# Patient Record
Sex: FEMALE | Race: WHITE | NOT HISPANIC OR LATINO | URBAN - METROPOLITAN AREA
[De-identification: names, ages, dates, MRNs, and addresses within clinical notes are randomized per-mention and may not be internally consistent; named-entity substitution may affect disease eponyms.]

---
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
## 2021-09-03 ENCOUNTER — TRANSCRIPTION ENCOUNTER (OUTPATIENT)
Age: 21
End: 2021-09-03

## 2022-02-25 PROBLEM — Z78.9 OTHER SPECIFIED HEALTH STATUS: Chronic | Status: ACTIVE | Noted: 2020-08-31

## 2022-02-28 NOTE — ED ADULT NURSE NOTE - NS ED NURSE RECORD ANOTHER VITAL SIGN
Yes Ketoconazole Counseling:   Patient counseled regarding improving absorption with orange juice.  Adverse effects include but are not limited to breast enlargement, headache, diarrhea, nausea, upset stomach, liver function test abnormalities, taste disturbance, and stomach pain.  There is a rare possibility of liver failure that can occur when taking ketoconazole. The patient understands that monitoring of LFTs may be required, especially at baseline. The patient verbalized understanding of the proper use and possible adverse effects of ketoconazole.  All of the patient's questions and concerns were addressed.

## 2022-05-10 ENCOUNTER — LABORATORY RESULT (OUTPATIENT)
Age: 22
End: 2022-05-10

## 2022-05-10 ENCOUNTER — APPOINTMENT (OUTPATIENT)
Dept: GASTROENTEROLOGY | Facility: CLINIC | Age: 22
End: 2022-05-10
Payer: COMMERCIAL

## 2022-05-10 VITALS
BODY MASS INDEX: 20.89 KG/M2 | SYSTOLIC BLOOD PRESSURE: 108 MMHG | HEIGHT: 66 IN | DIASTOLIC BLOOD PRESSURE: 62 MMHG | WEIGHT: 130 LBS

## 2022-05-10 VITALS — HEART RATE: 78 BPM | RESPIRATION RATE: 14 BRPM

## 2022-05-10 PROCEDURE — 36415 COLL VENOUS BLD VENIPUNCTURE: CPT

## 2022-05-10 PROCEDURE — 99203 OFFICE O/P NEW LOW 30 MIN: CPT | Mod: 25

## 2022-05-10 NOTE — PHYSICAL EXAM
[General Appearance - Alert] : alert [General Appearance - In No Acute Distress] : in no acute distress [Sclera] : the sclera and conjunctiva were normal [PERRL With Normal Accommodation] : pupils were equal in size, round, and reactive to light [Extraocular Movements] : extraocular movements were intact [Outer Ear] : the ears and nose were normal in appearance [Oropharynx] : the oropharynx was normal [Neck Appearance] : the appearance of the neck was normal [Neck Cervical Mass (___cm)] : no neck mass was observed [Jugular Venous Distention Increased] : there was no jugular-venous distention [Thyroid Diffuse Enlargement] : the thyroid was not enlarged [Thyroid Nodule] : there were no palpable thyroid nodules [] : no respiratory distress [Auscultation Breath Sounds / Voice Sounds] : lungs were clear to auscultation bilaterally [Heart Rate And Rhythm] : heart rate was normal and rhythm regular [Heart Sounds] : normal S1 and S2 [Heart Sounds Gallop] : no gallops [Murmurs] : no murmurs [Heart Sounds Pericardial Friction Rub] : no pericardial rub [Cervical Lymph Nodes Enlarged Posterior Bilaterally] : posterior cervical [Cervical Lymph Nodes Enlarged Anterior Bilaterally] : anterior cervical [Supraclavicular Lymph Nodes Enlarged Bilaterally] : supraclavicular [Axillary Lymph Nodes Enlarged Bilaterally] : axillary [Femoral Lymph Nodes Enlarged Bilaterally] : femoral [Inguinal Lymph Nodes Enlarged Bilaterally] : inguinal [No CVA Tenderness] : no ~M costovertebral angle tenderness [No Spinal Tenderness] : no spinal tenderness [Deep Tendon Reflexes (DTR)] : deep tendon reflexes were 2+ and symmetric [Sensation] : the sensory exam was normal to light touch and pinprick [No Focal Deficits] : no focal deficits [Oriented To Time, Place, And Person] : oriented to person, place, and time [Impaired Insight] : insight and judgment were intact [Affect] : the affect was normal

## 2022-05-10 NOTE — HISTORY OF PRESENT ILLNESS
[Heartburn] : denies heartburn [Nausea] : denies nausea [Vomiting] : denies vomiting [Diarrhea] : denies diarrhea [Constipation] : denies constipation [Yellow Skin Or Eyes (Jaundice)] : denies jaundice [Abdominal Swelling] : denies abdominal swelling [Rectal Pain] : denies rectal pain [Abdominal Pain] : abdominal pain [Wt Gain ___ Lbs] : no recent weight gain [Wt Loss ___ Lbs] : no recent weight loss [GERD] : no gastroesophageal reflux disease [Hiatus Hernia] : no hiatus hernia [Peptic Ulcer Disease] : no peptic ulcer disease [Pancreatitis] : no pancreatitis [Kidney Stone] : no kidney stone [Inflammatory Bowel Disease] : no inflammatory bowel disease [Irritable Bowel Syndrome] : no irritable bowel syndrome [Diverticulitis] : no diverticulitis [Alcohol Abuse] : no alcohol abuse [Malignancy] : no malignancy [Abdominal Surgery] : no abdominal surgery [Appendectomy] : no appendectomy [Cholecystectomy] : no cholecystectomy [de-identified] : 22 yo female with long standing epigastric pain, and sensitivity to dairy.  The sensitivity to dairy seems to develop the last 2 years.  She is currently a college student.  There is no weight loss anorexia.  There is no family history of inflammatory bowel disease.  Alcohol and coffee appear to bother her stomach.  There have been no recent laboratory testing.  She describes her bowel movements is mostly soft but without any nocturnal symptoms.

## 2022-05-10 NOTE — ASSESSMENT
[FreeTextEntry1] : 22 yo female with long standing epigastric pain, and sensitivity to dairy.  The sensitivity to dairy seems to develop the last 2 years.  She is currently a college student.  There is no weight loss anorexia.  There is no family history of inflammatory bowel disease.  Alcohol and coffee appear to bother her stomach.  There have been no recent laboratory testing.  She describes her bowel movements is mostly soft but without any nocturnal symptoms.\par \par Plan:\par 1. Celiac, crp, cbc, cmp\par 2. Await calprotectin, stool hpylori\par 3.Sonogram of abdomen\par 4. RTO 3 weeks; if abnormal labs, elevated calprotectin, possible colonoscopy\par 5. Strict dairy avoidance for 1 week.

## 2022-05-11 ENCOUNTER — TRANSCRIPTION ENCOUNTER (OUTPATIENT)
Age: 22
End: 2022-05-11

## 2022-05-11 LAB
ALBUMIN SERPL ELPH-MCNC: 4.7 G/DL
ALP BLD-CCNC: 47 U/L
ALT SERPL-CCNC: 10 U/L
ANION GAP SERPL CALC-SCNC: 10 MMOL/L
AST SERPL-CCNC: 15 U/L
BASOPHILS # BLD AUTO: 0.02 K/UL
BASOPHILS NFR BLD AUTO: 0.5 %
BILIRUB SERPL-MCNC: 0.3 MG/DL
BUN SERPL-MCNC: 11 MG/DL
CALCIUM SERPL-MCNC: 9.5 MG/DL
CHLORIDE SERPL-SCNC: 106 MMOL/L
CO2 SERPL-SCNC: 22 MMOL/L
CREAT SERPL-MCNC: 0.64 MG/DL
CRP SERPL-MCNC: 3 MG/L
EGFR: 130 ML/MIN/1.73M2
EOSINOPHIL # BLD AUTO: 0.08 K/UL
EOSINOPHIL NFR BLD AUTO: 1.9 %
FERRITIN SERPL-MCNC: 31 NG/ML
GLUCOSE SERPL-MCNC: 85 MG/DL
HCT VFR BLD CALC: 41.3 %
HGB BLD-MCNC: 12.4 G/DL
IMM GRANULOCYTES NFR BLD AUTO: 0 %
LYMPHOCYTES # BLD AUTO: 2.62 K/UL
LYMPHOCYTES NFR BLD AUTO: 61.1 %
MAN DIFF?: NORMAL
MCHC RBC-ENTMCNC: 27.1 PG
MCHC RBC-ENTMCNC: 30 GM/DL
MCV RBC AUTO: 90.4 FL
MONOCYTES # BLD AUTO: 0.2 K/UL
MONOCYTES NFR BLD AUTO: 4.7 %
NEUTROPHILS # BLD AUTO: 1.37 K/UL
NEUTROPHILS NFR BLD AUTO: 31.8 %
PLATELET # BLD AUTO: 211 K/UL
POTASSIUM SERPL-SCNC: 4.6 MMOL/L
PROT SERPL-MCNC: 6.9 G/DL
RBC # BLD: 4.57 M/UL
RBC # FLD: 13.6 %
SODIUM SERPL-SCNC: 139 MMOL/L
TRANSFERRIN SERPL-MCNC: 261 MG/DL
VIT B12 SERPL-MCNC: 159 PG/ML
WBC # FLD AUTO: 4.29 K/UL

## 2022-05-12 ENCOUNTER — TRANSCRIPTION ENCOUNTER (OUTPATIENT)
Age: 22
End: 2022-05-12

## 2022-05-12 LAB — CELIACPAN: NORMAL

## 2022-05-16 ENCOUNTER — TRANSCRIPTION ENCOUNTER (OUTPATIENT)
Age: 22
End: 2022-05-16

## 2022-05-19 ENCOUNTER — OUTPATIENT (OUTPATIENT)
Dept: OUTPATIENT SERVICES | Facility: HOSPITAL | Age: 22
LOS: 1 days | End: 2022-05-19
Payer: COMMERCIAL

## 2022-05-19 ENCOUNTER — TRANSCRIPTION ENCOUNTER (OUTPATIENT)
Age: 22
End: 2022-05-19

## 2022-05-19 ENCOUNTER — APPOINTMENT (OUTPATIENT)
Dept: ULTRASOUND IMAGING | Facility: CLINIC | Age: 22
End: 2022-05-19
Payer: COMMERCIAL

## 2022-05-19 DIAGNOSIS — R14.0 ABDOMINAL DISTENSION (GASEOUS): ICD-10-CM

## 2022-05-19 DIAGNOSIS — Z00.8 ENCOUNTER FOR OTHER GENERAL EXAMINATION: ICD-10-CM

## 2022-05-19 PROCEDURE — 76700 US EXAM ABDOM COMPLETE: CPT

## 2022-05-19 PROCEDURE — 76700 US EXAM ABDOM COMPLETE: CPT | Mod: 26

## 2022-05-24 ENCOUNTER — TRANSCRIPTION ENCOUNTER (OUTPATIENT)
Age: 22
End: 2022-05-24

## 2022-05-24 LAB
ANNOTATION COMMENT IMP: NORMAL
HLA-DQ2: NEGATIVE
HLA-DQ8 QL: POSITIVE
REF LAB TEST METHOD: NORMAL

## 2022-06-02 ENCOUNTER — APPOINTMENT (OUTPATIENT)
Dept: GASTROENTEROLOGY | Facility: CLINIC | Age: 22
End: 2022-06-02

## 2022-06-09 ENCOUNTER — APPOINTMENT (OUTPATIENT)
Dept: GASTROENTEROLOGY | Facility: CLINIC | Age: 22
End: 2022-06-09
Payer: COMMERCIAL

## 2022-06-09 VITALS
HEIGHT: 66 IN | RESPIRATION RATE: 14 BRPM | SYSTOLIC BLOOD PRESSURE: 108 MMHG | DIASTOLIC BLOOD PRESSURE: 68 MMHG | WEIGHT: 130 LBS | BODY MASS INDEX: 20.89 KG/M2 | TEMPERATURE: 98.1 F

## 2022-06-09 PROCEDURE — 36415 COLL VENOUS BLD VENIPUNCTURE: CPT

## 2022-06-09 PROCEDURE — 99214 OFFICE O/P EST MOD 30 MIN: CPT | Mod: 25

## 2022-06-09 NOTE — HISTORY OF PRESENT ILLNESS
[Heartburn] : denies heartburn [Nausea] : denies nausea [Vomiting] : denies vomiting [Diarrhea] : denies diarrhea [Constipation] : denies constipation [Yellow Skin Or Eyes (Jaundice)] : denies jaundice [Abdominal Swelling] : denies abdominal swelling [Rectal Pain] : denies rectal pain [Abdominal Pain] : abdominal pain [Wt Gain ___ Lbs] : no recent weight gain [Wt Loss ___ Lbs] : no recent weight loss [GERD] : no gastroesophageal reflux disease [Hiatus Hernia] : no hiatus hernia [Peptic Ulcer Disease] : no peptic ulcer disease [Pancreatitis] : no pancreatitis [Kidney Stone] : no kidney stone [Inflammatory Bowel Disease] : no inflammatory bowel disease [Irritable Bowel Syndrome] : no irritable bowel syndrome [Diverticulitis] : no diverticulitis [Alcohol Abuse] : no alcohol abuse [Malignancy] : no malignancy [Abdominal Surgery] : no abdominal surgery [Appendectomy] : no appendectomy [Cholecystectomy] : no cholecystectomy [de-identified] : 22 yo female with long standing epigastric pain, and sensitivity to dairy.  The sensitivity to dairy seems to develop the last 2 years.  She is currently a college student.  There is no weight loss anorexia.  There is no family history of inflammatory bowel disease.  Alcohol and coffee appear to bother her stomach.  There have been no recent laboratory testing.  She describes her bowel movements is mostly soft but without any nocturnal symptoms.\par \par Returns in followup: sonogram normal fecal calprotecin, PCR assay , hpylori all normal. Celiac panel and cbc, reactive protein normal . B12 was deficient at 159. She denies paresthesias, numbness. Doesn't  eat much meat.

## 2022-06-09 NOTE — ASSESSMENT
[FreeTextEntry1] : 20 yo female with long standing epigastric pain, and sensitivity to dairy.  The sensitivity to dairy seems to develop the last 2 years.  She is currently a college student.  There is no weight loss anorexia.  There is no family history of inflammatory bowel disease.  Alcohol and coffee appear to bother her stomach.  There have been no recent laboratory testing.  She describes her bowel movements is mostly soft but without any nocturnal symptoms.\par \par Returns in followup: sonogram normal fecal calprotecin, PCR assay , hpylori all normal. Celiac panel and cbc, reactive protein normal . B12 was deficient at 159. She denies paresthesias, numbness. Doesn't  eat much meat.\par \par \par Plan:\par 1. B12 1000 mg daily.\par 2. followup in  2 months\par 3. PRN lactaid.

## 2022-06-10 ENCOUNTER — TRANSCRIPTION ENCOUNTER (OUTPATIENT)
Age: 22
End: 2022-06-10

## 2022-06-10 LAB — VIT B12 SERPL-MCNC: 249 PG/ML

## 2022-08-18 ENCOUNTER — APPOINTMENT (OUTPATIENT)
Dept: GASTROENTEROLOGY | Facility: CLINIC | Age: 22
End: 2022-08-18

## 2022-08-18 VITALS
SYSTOLIC BLOOD PRESSURE: 110 MMHG | DIASTOLIC BLOOD PRESSURE: 70 MMHG | WEIGHT: 129 LBS | BODY MASS INDEX: 21.49 KG/M2 | HEIGHT: 65 IN

## 2022-08-18 VITALS — HEART RATE: 64 BPM | RESPIRATION RATE: 12 BRPM

## 2022-08-18 DIAGNOSIS — R19.7 DIARRHEA, UNSPECIFIED: ICD-10-CM

## 2022-08-18 PROCEDURE — 36415 COLL VENOUS BLD VENIPUNCTURE: CPT

## 2022-08-18 PROCEDURE — 99214 OFFICE O/P EST MOD 30 MIN: CPT | Mod: 25

## 2022-08-18 NOTE — HISTORY OF PRESENT ILLNESS
[Heartburn] : denies heartburn [Nausea] : denies nausea [Vomiting] : denies vomiting [Diarrhea] : denies diarrhea [Constipation] : denies constipation [Yellow Skin Or Eyes (Jaundice)] : denies jaundice [Abdominal Swelling] : denies abdominal swelling [Rectal Pain] : denies rectal pain [Abdominal Pain] : abdominal pain [_________] : Performed [unfilled] [Wt Gain ___ Lbs] : no recent weight gain [Wt Loss ___ Lbs] : no recent weight loss [GERD] : no gastroesophageal reflux disease [Hiatus Hernia] : no hiatus hernia [Peptic Ulcer Disease] : no peptic ulcer disease [Pancreatitis] : no pancreatitis [Kidney Stone] : no kidney stone [Inflammatory Bowel Disease] : no inflammatory bowel disease [Irritable Bowel Syndrome] : no irritable bowel syndrome [Diverticulitis] : no diverticulitis [Alcohol Abuse] : no alcohol abuse [Malignancy] : no malignancy [Abdominal Surgery] : no abdominal surgery [Appendectomy] : no appendectomy [Cholecystectomy] : no cholecystectomy [de-identified] : 22 yo female with long standing epigastric pain, and sensitivity to dairy.  The sensitivity to dairy seems to develop the last 2 years.  She is currently a college student.  There is no weight loss anorexia.  There is no family history of inflammatory bowel disease.  Alcohol and coffee appear to bother her stomach.  There have been no recent laboratory testing.  She describes her bowel movements is mostly soft but without any nocturnal symptoms.\par \par She returns in follow-up today having taking her B12.  We will reassess today.  She still complains of some bloating which is not associated with meals or bowel movements.  We discussed the usage of hyoscyamine.  While she feels well.  Uses over-the-counter Lactaid supplements with good benefit with dairy products. [de-identified] : normal

## 2022-08-20 ENCOUNTER — TRANSCRIPTION ENCOUNTER (OUTPATIENT)
Age: 22
End: 2022-08-20

## 2022-08-21 ENCOUNTER — TRANSCRIPTION ENCOUNTER (OUTPATIENT)
Age: 22
End: 2022-08-21

## 2022-08-21 LAB — VIT B12 SERPL-MCNC: 523 PG/ML

## 2022-08-23 LAB — METHYLMALONATE SERPL-SCNC: 105 NMOL/L

## 2022-09-13 ENCOUNTER — TRANSCRIPTION ENCOUNTER (OUTPATIENT)
Age: 22
End: 2022-09-13

## 2022-10-19 ENCOUNTER — RESULT REVIEW (OUTPATIENT)
Age: 22
End: 2022-10-19

## 2022-12-14 ENCOUNTER — APPOINTMENT (OUTPATIENT)
Dept: GASTROENTEROLOGY | Facility: CLINIC | Age: 22
End: 2022-12-14

## 2022-12-14 VITALS
HEART RATE: 74 BPM | WEIGHT: 129 LBS | HEIGHT: 65 IN | BODY MASS INDEX: 21.49 KG/M2 | OXYGEN SATURATION: 99 % | RESPIRATION RATE: 12 BRPM | SYSTOLIC BLOOD PRESSURE: 98 MMHG | DIASTOLIC BLOOD PRESSURE: 60 MMHG

## 2022-12-14 DIAGNOSIS — E53.8 DEFICIENCY OF OTHER SPECIFIED B GROUP VITAMINS: ICD-10-CM

## 2022-12-14 DIAGNOSIS — E04.1 NONTOXIC SINGLE THYROID NODULE: ICD-10-CM

## 2022-12-14 DIAGNOSIS — N92.6 IRREGULAR MENSTRUATION, UNSPECIFIED: ICD-10-CM

## 2022-12-14 PROCEDURE — 99214 OFFICE O/P EST MOD 30 MIN: CPT

## 2022-12-14 NOTE — HISTORY OF PRESENT ILLNESS
[Heartburn] : denies heartburn [Nausea] : denies nausea [Vomiting] : denies vomiting [Diarrhea] : denies diarrhea [Constipation] : denies constipation [Yellow Skin Or Eyes (Jaundice)] : denies jaundice [Abdominal Swelling] : denies abdominal swelling [Rectal Pain] : denies rectal pain [Abdominal Pain] : abdominal pain [_________] : Performed [unfilled] [FreeTextEntry1] : 21 yo female with long standing epigastric pain, and sensitivity to dairy.  The sensitivity to dairy seems to develop the last 2 years.  She is currently a college student.  There is no weight loss anorexia.  There is no family history of inflammatory bowel disease.  Alcohol and coffee appear to bother her stomach. \par \par We assessed her B12 supplementation is now to normal level.  Other laboratory tests were fine.  She is doing much better since her last visit using Lactaid tablets before dairy meals and she has identified certain triggers such as rice for abdominal discomfort.  She is using hyoscyamine on a as needed basis.  There is no weight loss or anorexia [de-identified] : 05/2022 normal [Wt Gain ___ Lbs] : no recent weight gain [Wt Loss ___ Lbs] : no recent weight loss [GERD] : no gastroesophageal reflux disease [Hiatus Hernia] : no hiatus hernia [Peptic Ulcer Disease] : no peptic ulcer disease [Pancreatitis] : no pancreatitis [Kidney Stone] : no kidney stone [Inflammatory Bowel Disease] : no inflammatory bowel disease [Irritable Bowel Syndrome] : no irritable bowel syndrome [Diverticulitis] : no diverticulitis [Alcohol Abuse] : no alcohol abuse [Malignancy] : no malignancy [Abdominal Surgery] : no abdominal surgery [Appendectomy] : no appendectomy [Cholecystectomy] : no cholecystectomy [de-identified] : normal

## 2022-12-14 NOTE — PHYSICAL EXAM
[General Appearance - Alert] : alert [General Appearance - In No Acute Distress] : in no acute distress [PERRL With Normal Accommodation] : pupils were equal in size, round, and reactive to light [Extraocular Movements] : extraocular movements were intact [Outer Ear] : the ears and nose were normal in appearance [Neck Appearance] : the appearance of the neck was normal [Neck Cervical Mass (___cm)] : no neck mass was observed [Jugular Venous Distention Increased] : there was no jugular-venous distention [Thyroid Diffuse Enlargement] : the thyroid was not enlarged [Thyroid Nodule] : there were no palpable thyroid nodules [Heart Sounds] : normal S1 and S2 [Heart Sounds Gallop] : no gallops [Heart Sounds Pericardial Friction Rub] : no pericardial rub [Cervical Lymph Nodes Enlarged Posterior Bilaterally] : posterior cervical [Cervical Lymph Nodes Enlarged Anterior Bilaterally] : anterior cervical [Supraclavicular Lymph Nodes Enlarged Bilaterally] : supraclavicular [Axillary Lymph Nodes Enlarged Bilaterally] : axillary [Femoral Lymph Nodes Enlarged Bilaterally] : femoral [Inguinal Lymph Nodes Enlarged Bilaterally] : inguinal [No CVA Tenderness] : no ~M costovertebral angle tenderness [No Spinal Tenderness] : no spinal tenderness [Deep Tendon Reflexes (DTR)] : deep tendon reflexes were 2+ and symmetric [Sensation] : the sensory exam was normal to light touch and pinprick [No Focal Deficits] : no focal deficits [Impaired Insight] : insight and judgment were intact [Affect] : the affect was normal [Alert] : alert [Normal Voice/Communication] : normal voice/communication [Healthy Appearing] : healthy appearing [No Acute Distress] : no acute distress [Sclera] : the sclera and conjunctiva were normal [Hearing Threshold Finger Rub Not [place]] : hearing was normal [Normal Lips/Gums] : the lips and gums were normal [Oropharynx] : the oropharynx was normal [Normal Appearance] : the appearance of the neck was normal [No Neck Mass] : no neck mass was observed [No Respiratory Distress] : no respiratory distress [No Acc Muscle Use] : no accessory muscle use [Respiration, Rhythm And Depth] : normal respiratory rhythm and effort [Auscultation Breath Sounds / Voice Sounds] : lungs were clear to auscultation bilaterally [Heart Rate And Rhythm] : heart rate was normal and rhythm regular [Normal S1, S2] : normal S1 and S2 [Murmurs] : no murmurs [Bowel Sounds] : normal bowel sounds [Abdomen Tenderness] : non-tender [No Masses] : no abdominal mass palpated [Abdomen Soft] : soft [] : no hepatosplenomegaly [Oriented To Time, Place, And Person] : oriented to person, place, and time

## 2022-12-27 ENCOUNTER — RX RENEWAL (OUTPATIENT)
Age: 22
End: 2022-12-27

## 2022-12-27 RX ORDER — HYOSCYAMINE SULFATE 0.12 MG/1
0.12 TABLET SUBLINGUAL 3 TIMES DAILY
Qty: 90 | Refills: 1 | Status: ACTIVE | COMMUNITY
Start: 2022-08-18 | End: 1900-01-01

## 2023-08-13 NOTE — ASSESSMENT
Upon arrival to place PICC patient's condition had deteriorated, Dr Lal at bedside placing IJ.   [FreeTextEntry1] : 22 yo female with long standing epigastric pain, and sensitivity to dairy.  The sensitivity to dairy seems to develop the last 2 years.  She is currently a college student.  There is no weight loss anorexia.  There is no family history of inflammatory bowel disease.  Alcohol and coffee appear to bother her stomach.  There have been no recent laboratory testing.  She describes her bowel movements is mostly soft but without any nocturnal symptoms.\par \par Returns in followup: sonogram normal fecal calprotecin, PCR assay , hpylori all normal. Celiac panel and cbc, reactive protein normal . B12 was deficient at 159. She denies paresthesias, numbness. Doesn't  eat much meat.\par \par \par She has been taking her B12 1000 mcg a day.  Her bloating we will treat with hyoscyamine 0.125 mg sublingual at least twice a day and she will put in follow-up within 1 to 2 weeks.  She will continue with the lactate.  I asked her to increase her physical activity and I will see her on a as needed basis

## 2023-10-04 NOTE — ED PROVIDER NOTE - NS ED MD DISPO DISCHARGE
FEMALE ANNUAL EXAM NOTE    HISTORY  Neha Richard  is a 28 year old female who presents for an annual exam. She did not get her pre-visit lab done; she is fasting today to do lab.    Last year had abnormal pap; had colposcopy with Dr Dhara Geiger OB/ GYN, is seeing her again in November for repeat pap/ exam.       MEDICAL HISTORY  Past Medical History:   Diagnosis Date   • Bipolar 2 disorder (CMD)     following Dr. Cyndi Ponce but not since pregnant   • Depressive disorder    • Dysfunction of both eustachian tubes 2022   • Failed moderate sedation during procedure    • Vertigo 2022       SURGICAL HISTORY  Past Surgical History:   Procedure Laterality Date   •  section, low transverse  2016    Primary    • Closed reduction forearm fracture  10/03/2007    without internal fixation   • Colonoscopy diagnostic  2023   • Colposcopy cervix & upper / adjacent vagina  10/31/2022    Dr. Geiger   • Esophagogastroduodenoscopy transoral flex w/bx single or mult  2023   • Intrauterine copper contraceptive  2019    Paragard[facility]   • Nail removal  2008    ingrown toenail -Great right toe   • Wrist surgery Right 2021    carpal boss excision       SOCIAL HISTORY  Social History     Tobacco Use   • Smoking status: Never     Passive exposure: Never   • Smokeless tobacco: Never   Vaping Use   • Vaping Use: never used   Substance Use Topics   • Alcohol use: Yes     Alcohol/week: 0.0 - 1.0 standard drinks of alcohol     Comment: occasional   • Drug use: No       Social History     Social History Narrative    Grew up in home with both parents being together and . #2 of 3 children born to her parents. Graduated from [employer] in . Then [employer] for one semester - didn't finish a program. Single never . One daughter born 3-. Lives in an apartment with her daughter. Angel had visitation with her father 2-2-3 schedule so  about 50% each parent. 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         Health Maintenance:        Mammogram:NA.  Great Aunt on father's side.  Age 40s    Colon Cancer Screening:NA     Pap Smear:10/25/17 normal 3 year    Cholesterol Screening:10/25/17 normal    Influenza Vaccine: 10/25/17 defers    Pneumonia Vaccine:Na    HPV Vaccine:DUE    DTAP Booster: 1/22/16    Dental Visit: Has one coming up 10/17    Eye Exam: Glasses.  2017    POA:       FAMILY HISTORY  Family History   Problem Relation Age of Onset   • ADHD/ADD Mother    • Bipolar disorder Mother    • Obsesive Compulsive Disorder Mother    • Migraine Mother    • Diabetes Father         Type 2, oral meds    • Bipolar disorder Brother    • Heart disease Maternal Grandmother    • Alcohol Abuse Paternal Grandfather    • Schizophrenia Other    • Cancer, Breast Other         P-great aunt    • Cancer Other         P-great aunt    • NEGATIVE FAMILY HX OF Other         FOB- Maternal side with twins    • NEGATIVE FAMILY HX OF Other         Denies fetal anamolies        MEDICATIONS  Current Outpatient Medications   Medication Sig Dispense Refill   • famotidine (Pepcid) 20 MG tablet Take 1 tablet by mouth 2 times daily as needed (Acid reflux). 60 tablet 5   • dicyclomine (BENTYL) 10 MG capsule Take 1 capsule by mouth 4 times daily as needed (abdominal pain). 60 capsule 1     No current facility-administered medications for this visit.         ALLERGIES  ALLERGIES:   Allergen Reactions   • Mirtazapine Other (See Comments)     Weight gain    • Viibryd VOMITING       REVIEW OF SYSTEMS  Overall \"I feel fine, I feel not super motivated to get things done. Working 10 pm to 7 am. Sleeps 9-3 while daughter is in school, no energy to get house work done\". Hoping to get a different job -different  work hours.   Constitutional:  Denies fevers, chills, weakness, fatigue, loss of appetite, abnormal weight gain or abnormal weight loss.  Eyes:  Denies blindness, blurred vision,  double vision, pain, itching or burning. Last eye exam - < 1 year; wearing glasses  HENT:  Denies facial pain, ear pain, hearing loss, tinnitus, nasal congestion, rhinorrhea, epistaxis, sinus pain, mouth lesions or sore throat.  Last dental exam - \"probably seven - eight years\".   Respiratory:  Denies SOB (shortness of breath), cough, sputum production, hemoptysis or wheezing.    Cardiovascular:  Denies chest pains, palpitations, tachycardia, edema, cyanosis or vertigo.  Gastrointestinal:  Denies abdominal pain, heartburn, nausea, vomiting, diarrhea, constipation or blood in stool.  Bowel Movement - depending on what eats; once or twice a day - if eats wrong food 4-5 times a day.established with Lilli JACKSON - last saw her 2023  Breast: Denies any changes. Does self breast exam- yes  Musculoskeletal:  Denies back pain, joint pain, joint swelling or tenderness, muscle pains or spasms. Denies neck pain, stiffness or swelling.  Neurologic:  Denies numbness, tingling, other sensory changes, sudden weakness in arms or legs. Denies confusion, headache, dizziness, memory loss or tremors.  Genitourinary:  Denies urinary frequency, nocturia, urgency, incontinence, dysuria or hematuria.  Menarche- age 12,  Patient's last menstrual period was 2023 (exact date). once every month to every other month. Not in currentl relationship  Hematologic/Lymphatic:  Denies easy bruising or bleeding, swollen lymph glands.  Endocrine:  Denies heat or cold intolerance, polydipsia or polyuria.  Denies changes in hair or skin texture.  Integument:  Denies new rashes or lesions, pruritus or dryness of skin.  Psychiatric:  Denies anxiety, depression, hallucinations, irritability or sleeping problems.  Allergic/Immunologic:  Denies recurrent infections, hypersensitivity.  Last DtaP- 2016  Habits:  Diet - tries to avoid spicy, fatty foods, acidic foods, - eats bread, fruit, veggies, some cottage cheese only/ avoids dairy.  Ok with penaut butter Calcium intake - salads 3-4 times per week, cottage cheese but not recently, Caffeine intake - one Mountain Dew per day, Exercise- tracking steps; does a lot of walking at night - walks 10 minutes to / from daughters school bid, Sleep limited due to working night shift - sleeps after daughter goes to school 8:30 am up around 3 -3:10 for when daughter is done with school    All other Review of Systems negative.    PHYSICAL EXAMINATION  Vital Signs:  Blood pressure 102/54, pulse (!) 56, resp. rate 16, height 5' 1\" (1.549 m), weight 53.2 kg (117 lb 4.6 oz), last menstrual period 09/04/2023. Body mass index is 22.16 kg/m².  General:  Well-developed, well-nourished. In no apparent distress.  Eyes:  PERRL, EOMI (Pupils equal, round, reactive to light, extraocular movements intact). Conjunctivae pink. Sclerae anicteric.  HENT:  Normocephalic, atraumatic. Bilateral external ears, ear canals and tympanic membranes are normal. External nose is normal. Septum is midline. Turbinates non-edematous, pink and moist. Oropharynx is clear.  Uvula midline. Oral membranes moist. Lips pink.  Neck:  Supple. Nontender. Normal range of motion. No masses. No thyromegaly.  Trachea midline.  Respiratory:  Normal respiratory effort. Respirations regular and relaxed. Lungs clear to auscultation bilaterally. Symmetrical chest expansion.  Cardiovascular:  Regular rate and rhythm. No murmurs. Normal S1 and S2..No carotid bruits. Good dorsalis pedis pulses bilaterally. No peripheral edema.  Gastrointestinal:  Soft. Nontender. Non-distended. Normal bowel sounds. No pulsatile or other abdominal masses. No hepatosplenomegaly or splenomegaly.   Breasts:  Symmetric. No masses. No nipple discharge.  Genitourinary:  Not examined today  Musculoskeletal:  No clubbing or cyanosis. Active range of motion in all 4 extremities proximal and distal. No joint swelling or tenderness in right and left shoulders, elbows, wrists and fingers. No  joint swelling or tenderness in left and right knees, ankles and toes.  Neurologic:  Alert and oriented times 3. Gait is normal. Normal sensory function. No motor deficits in all 4 extremities.  Symmetrical bilateral knee deep tendon relflexes.  Integumentary:  Warm. Dry. Pink. No rashes or lesions. No wounds.  Lymphatic:  No lymphadenopathy in submental, submandibular or cervical chain. No supraclavicular or infraclavicular lymphadenopathy. No axillary or inguinal/groin lymphadenopathy.  Psychiatric: Cooperative. Appropriate mood and affect. Normal judgment.      Review Flowsheet  More data exists       10/4/2023   PHQ 2/9 Score   Adult PHQ 2 Score 0   Adult PHQ 2 Interpretation No further screening needed   Little interest or pleasure in activity? Not at all   Feeling down, depressed or hopeless? Not at all       DEPRESSION ASSESSMENT/PLAN:  Depression screening is negative no further plan needed.    RESULTS  Pertinent labs and imaging studies reviewed.    ASSESSMENT AND PLAN  1. A 28-year-old  female, established in Family Practice complete physical exam. Continue SBE; first pap age 41. Pap with ob/ gyn provider in November. Follow per Dr. Geiger's recommendations. Consider calcium supplement due to limited calcium intake up to 1000 mg daily. Consider vitamin D supplement. Continue cardiovascular exercise 30 minute most days the week which she feels she is getting.Immunizations: recommend covid vaccine - has never had any; declined. Recommend seasonal influenza declined. Going to lab now for fasting lab. In future prefer pre-visit lab so can review results together at appointment. Be seen in clinic one year.     2. Normal BMI Body mass index is 22.16 kg/m².BMI ASSESSMENT/PLAN:  Patient BMI is within normal range.     3. Irritable bowel with diarrhea, GERD without esophagitis, chronic superficial gastritis without bleeding. Followed per GI providers recommendations.        Home

## 2024-04-06 ENCOUNTER — NON-APPOINTMENT (OUTPATIENT)
Age: 24
End: 2024-04-06

## 2024-05-03 ENCOUNTER — APPOINTMENT (OUTPATIENT)
Dept: GASTROENTEROLOGY | Facility: CLINIC | Age: 24
End: 2024-05-03
Payer: COMMERCIAL

## 2024-05-03 VITALS
TEMPERATURE: 97 F | WEIGHT: 126 LBS | BODY MASS INDEX: 20.99 KG/M2 | HEIGHT: 65 IN | DIASTOLIC BLOOD PRESSURE: 70 MMHG | OXYGEN SATURATION: 99 % | SYSTOLIC BLOOD PRESSURE: 104 MMHG | RESPIRATION RATE: 14 BRPM | HEART RATE: 98 BPM

## 2024-05-03 DIAGNOSIS — F41.9 ANXIETY DISORDER, UNSPECIFIED: ICD-10-CM

## 2024-05-03 DIAGNOSIS — R14.0 ABDOMINAL DISTENSION (GASEOUS): ICD-10-CM

## 2024-05-03 PROCEDURE — 99213 OFFICE O/P EST LOW 20 MIN: CPT

## 2024-05-03 NOTE — ASSESSMENT
[FreeTextEntry1] : 24 yo female with long standing epigastric pain, and sensitivity to dairy.  The sensitivity to dairy seems to develop the last 2 years.  She is currently a college student.  There is no weight loss anorexia.  There is no family history of inflammatory bowel disease.  Alcohol and coffee appear to bother her stomach.   We assessed her B12 supplementation is now to normal level.  Other laboratory tests were fine.  She is doing much better since her last visit using Lactaid tablets before dairy meals and she has identified certain triggers such as rice for abdominal discomfort.  She is using hyoscyamine on a as needed basis.  There is no weight loss or anorexia  RTO today, now with bloating, epigastric discomfort. No weight loss, anorexia. No abnormal bms.  IMP: 1. lactose intolerance 2. IBS  PLAN: 1. RTO prn 2. famotidine 40 mg po qhs x 30 days. pt encouraged to update me as to how she is in 2 weeks.

## 2024-05-03 NOTE — HISTORY OF PRESENT ILLNESS
[FreeTextEntry1] : 22 yo female with long standing epigastric pain, and sensitivity to dairy.  The sensitivity to dairy seems to develop the last 2 years.  She is currently a college student.  There is no weight loss anorexia.  There is no family history of inflammatory bowel disease.  Alcohol and coffee appear to bother her stomach.   We assessed her B12 supplementation is now to normal level.  Other laboratory tests were fine.  She is doing much better since her last visit using Lactaid tablets before dairy meals and she has identified certain triggers such as rice for abdominal discomfort.  She is using hyoscyamine on a as needed basis.  There is no weight loss or anorexia  RTO today, now with bloating, epigastric discomfort. No weight loss, anorexia. No abnormal bms. [de-identified] : 2022 egd [de-identified] : 05/2022 normal [Heartburn] : denies heartburn [Nausea] : denies nausea [Vomiting] : denies vomiting [Diarrhea] : denies diarrhea [Constipation] : denies constipation [Yellow Skin Or Eyes (Jaundice)] : denies jaundice [Abdominal Swelling] : denies abdominal swelling [Rectal Pain] : denies rectal pain [Wt Gain ___ Lbs] : no recent weight gain [Wt Loss ___ Lbs] : no recent weight loss [Abdominal Pain] : abdominal pain [GERD] : no gastroesophageal reflux disease [Hiatus Hernia] : no hiatus hernia [Peptic Ulcer Disease] : no peptic ulcer disease [Pancreatitis] : no pancreatitis [Kidney Stone] : no kidney stone [Inflammatory Bowel Disease] : no inflammatory bowel disease [Irritable Bowel Syndrome] : no irritable bowel syndrome [Diverticulitis] : no diverticulitis [Alcohol Abuse] : no alcohol abuse [Malignancy] : no malignancy [Abdominal Surgery] : no abdominal surgery [Appendectomy] : no appendectomy [Cholecystectomy] : no cholecystectomy [_________] : Performed [unfilled] [de-identified] : normal

## 2024-05-03 NOTE — PHYSICAL EXAM
[Alert] : alert [Normal Voice/Communication] : normal voice/communication [Healthy Appearing] : healthy appearing [No Acute Distress] : no acute distress [Hearing Threshold Finger Rub Not [place]] : hearing was normal [Normal Lips/Gums] : the lips and gums were normal [Normal Appearance] : the appearance of the neck was normal [No Neck Mass] : no neck mass was observed [No Respiratory Distress] : no respiratory distress [No Acc Muscle Use] : no accessory muscle use [Respiration, Rhythm And Depth] : normal respiratory rhythm and effort [Normal S1, S2] : normal S1 and S2 [Bowel Sounds] : normal bowel sounds [Abdomen Tenderness] : non-tender [No Masses] : no abdominal mass palpated [Abdomen Soft] : soft [General Appearance - Alert] : alert [General Appearance - In No Acute Distress] : in no acute distress [Sclera] : the sclera and conjunctiva were normal [PERRL With Normal Accommodation] : pupils were equal in size, round, and reactive to light [Extraocular Movements] : extraocular movements were intact [Outer Ear] : the ears and nose were normal in appearance [Oropharynx] : the oropharynx was normal [Neck Appearance] : the appearance of the neck was normal [Neck Cervical Mass (___cm)] : no neck mass was observed [Jugular Venous Distention Increased] : there was no jugular-venous distention [Thyroid Diffuse Enlargement] : the thyroid was not enlarged [Thyroid Nodule] : there were no palpable thyroid nodules [] : no respiratory distress [Auscultation Breath Sounds / Voice Sounds] : lungs were clear to auscultation bilaterally [Heart Rate And Rhythm] : heart rate was normal and rhythm regular [Heart Sounds] : normal S1 and S2 [Heart Sounds Gallop] : no gallops [Murmurs] : no murmurs [Heart Sounds Pericardial Friction Rub] : no pericardial rub [Cervical Lymph Nodes Enlarged Posterior Bilaterally] : posterior cervical [Cervical Lymph Nodes Enlarged Anterior Bilaterally] : anterior cervical [Supraclavicular Lymph Nodes Enlarged Bilaterally] : supraclavicular [Axillary Lymph Nodes Enlarged Bilaterally] : axillary [Femoral Lymph Nodes Enlarged Bilaterally] : femoral [Inguinal Lymph Nodes Enlarged Bilaterally] : inguinal [No CVA Tenderness] : no ~M costovertebral angle tenderness [No Spinal Tenderness] : no spinal tenderness [Deep Tendon Reflexes (DTR)] : deep tendon reflexes were 2+ and symmetric [Sensation] : the sensory exam was normal to light touch and pinprick [No Focal Deficits] : no focal deficits [Oriented To Time, Place, And Person] : oriented to person, place, and time [Impaired Insight] : insight and judgment were intact [Affect] : the affect was normal

## 2024-05-28 ENCOUNTER — RX CHANGE (OUTPATIENT)
Age: 24
End: 2024-05-28

## 2024-05-28 RX ORDER — FAMOTIDINE 40 MG/1
40 TABLET, FILM COATED ORAL
Qty: 90 | Refills: 1 | Status: ACTIVE | COMMUNITY
Start: 1900-01-01 | End: 1900-01-01

## 2024-05-28 RX ORDER — FAMOTIDINE 40 MG/1
40 TABLET, FILM COATED ORAL
Qty: 30 | Refills: 1 | Status: DISCONTINUED | COMMUNITY
Start: 2024-05-03 | End: 2024-05-28